# Patient Record
Sex: MALE | Race: WHITE | NOT HISPANIC OR LATINO | URBAN - METROPOLITAN AREA
[De-identification: names, ages, dates, MRNs, and addresses within clinical notes are randomized per-mention and may not be internally consistent; named-entity substitution may affect disease eponyms.]

---

## 2023-08-29 ENCOUNTER — OFFICE VISIT (OUTPATIENT)
Dept: ORTHOPEDIC SURGERY | Facility: CLINIC | Age: 38
End: 2023-08-29
Payer: COMMERCIAL

## 2023-08-29 VITALS — BODY MASS INDEX: 32.51 KG/M2 | HEIGHT: 72 IN | WEIGHT: 240 LBS

## 2023-08-29 DIAGNOSIS — M19.019 OSTEOARTHRITIS OF AC (ACROMIOCLAVICULAR) JOINT: ICD-10-CM

## 2023-08-29 DIAGNOSIS — M75.42 IMPINGEMENT SYNDROME OF LEFT SHOULDER: ICD-10-CM

## 2023-08-29 DIAGNOSIS — M25.512 LEFT SHOULDER PAIN, UNSPECIFIED CHRONICITY: Primary | ICD-10-CM

## 2023-08-29 RX ORDER — NAPROXEN 500 MG/1
500 TABLET ORAL 2 TIMES DAILY
Qty: 60 TABLET | Refills: 2 | Status: SHIPPED | OUTPATIENT
Start: 2023-08-29

## 2023-08-29 NOTE — PROGRESS NOTES
"Chief Complaint  Initial Evaluation of the Left Shoulder     Subjective      Earl Ramsey presents to [facility] for evaluation of the left shoulder. He reports shoulder pain for awhile. He is a . He is working in [place]. He locates pain to the acromioclavicular joint. He has popping with ROM. He takes ibuprofen for the pain    No Known Allergies     Social History     Socioeconomic History    Marital status: Single   Tobacco Use    Smoking status: Some Days     Types: Cigarettes    Smokeless tobacco: Current     Types: Chew   Vaping Use    Vaping Use: Never used   Substance and Sexual Activity    Alcohol use: Yes     Comment: sometimes daily    Drug use: Never        I reviewed the patient's chief complaint, history of present illness, review of systems, past medical history, surgical history, family history, social history, medications, and allergy list.     Review of Systems     Constitutional: Denies fevers, chills, weight loss  Cardiovascular: Denies chest pain, shortness of breath  Skin: Denies rashes, acute skin changes  Neurologic: Denies headache, loss of consciousness  MSK: Left shoulder pain      Vital Signs:   Ht 182.9 cm (72\")   Wt 109 kg (240 lb)   BMI 32.55 kg/mý          Physical Exam  General: Alert. No acute distress    Ortho Exam      Left shoulder- swelling to the acromioclavicular joint. Non-tender. Forward elevation 175. Abduction 140. External Rotation 90. Internal rotation 80. Sensation to light touch median, radial, ulnar nerve. Positive AIN, PIN, ulnar nerve motor function. Positive pulses. 4+ supraspinatus strength, 5/5 infraspinatus and subscapularis. Internal rotation to T-12. Negative lift off. Negative cross arm adduction     Procedures    X-Ray Report:  Left scapula X-Ray  Indication: Evaluation of left shoulder pain  AP/Lateral view(s)  Findings: no acute fracture. Degenerative changes to the acromioclavicular joint   Prior " studies available for comparison: no       Imaging Results (Most Recent)       Procedure Component Value Units Date/Time    XR Scapula Left [[id number]] Resulted: 08/29/23 1610     Updated: 08/29/23 1616             Result Review :       No results found.           Assessment and Plan     Diagnoses and all orders for this visit:    1. Left shoulder pain, unspecified chronicity (Primary)  -     XR Scapula Left    2. Impingement syndrome of left shoulder    3. Osteoarthritis of AC (acromioclavicular) joint        Discussed the treatment plan with the patient.  I reviewed the x-rays that were obtained today with the patient. Plan for conservative treatment at this time. Order for MRI of the left shoulder to evaluate the rotator cuff. The patient expressed understanding and wished to proceed. Plan to discontinue diclofenac, prescription for naproxen given today.     Educated on risk of smoking. Discussed options for smoking cessation. and Call or return if worsening symptoms.    Follow Up     MRI results      Patient was given instructions and counseling regarding his condition or for health maintenance advice. Please see specific information pulled into the AVS if appropriate.     Scribed for Jose Sanchez MD by Emily Coker.  08/29/23   16:21 EDT    I have personally performed the services described in this document as scribed by the above individual and it is both accurate and complete. Jose Sanchez MD 09/01/23

## 2023-10-03 ENCOUNTER — HOSPITAL ENCOUNTER (OUTPATIENT)
Dept: MRI IMAGING | Facility: HOSPITAL | Age: 38
Discharge: HOME | End: 2023-10-03
Admitting: ORTHOPAEDIC SURGERY
Payer: COMMERCIAL

## 2023-10-03 DIAGNOSIS — M25.512 LEFT SHOULDER PAIN, UNSPECIFIED CHRONICITY: ICD-10-CM

## 2023-10-03 PROCEDURE — 73221 MRI JOINT UPR EXTREM W/O DYE: CPT
